# Patient Record
Sex: MALE | Race: WHITE
[De-identification: names, ages, dates, MRNs, and addresses within clinical notes are randomized per-mention and may not be internally consistent; named-entity substitution may affect disease eponyms.]

---

## 2023-04-30 ENCOUNTER — HOSPITAL ENCOUNTER (EMERGENCY)
Dept: HOSPITAL 54 - ER | Age: 18
Discharge: HOME | End: 2023-04-30
Payer: COMMERCIAL

## 2023-04-30 VITALS — DIASTOLIC BLOOD PRESSURE: 100 MMHG | SYSTOLIC BLOOD PRESSURE: 160 MMHG

## 2023-04-30 VITALS — BODY MASS INDEX: 28.93 KG/M2 | HEIGHT: 78 IN | WEIGHT: 250 LBS
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
## 2023-04-30 DIAGNOSIS — S01.01XA: Primary | ICD-10-CM
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
## 2023-04-30 NOTE — NUR
[id number], from [facility], clearance for booking, lac in the head s/p hitting his head 
on a [employer] car window. no loc. Pt A/Ox4. Tolerating R/A well with no resp 
distress. Safety measures in place.

## 2023-04-30 NOTE — NUR
Patient discharged to [facility] custody in stable condition. Written and verbal 
after care instructions given. Patient verbalizes understanding of instruction.